# Patient Record
Sex: FEMALE | Race: WHITE | NOT HISPANIC OR LATINO | URBAN - METROPOLITAN AREA
[De-identification: names, ages, dates, MRNs, and addresses within clinical notes are randomized per-mention and may not be internally consistent; named-entity substitution may affect disease eponyms.]

---

## 2017-12-09 ENCOUNTER — EMERGENCY (EMERGENCY)
Facility: HOSPITAL | Age: 47
LOS: 1 days | Discharge: HOME | End: 2017-12-09
Attending: EMERGENCY MEDICINE | Admitting: EMERGENCY MEDICINE
Payer: COMMERCIAL

## 2017-12-09 VITALS
RESPIRATION RATE: 18 BRPM | HEART RATE: 105 BPM | OXYGEN SATURATION: 97 % | SYSTOLIC BLOOD PRESSURE: 107 MMHG | TEMPERATURE: 98 F | DIASTOLIC BLOOD PRESSURE: 82 MMHG

## 2017-12-09 VITALS — HEIGHT: 66 IN | WEIGHT: 190.04 LBS

## 2017-12-09 DIAGNOSIS — K52.9 NONINFECTIVE GASTROENTERITIS AND COLITIS, UNSPECIFIED: ICD-10-CM

## 2017-12-09 DIAGNOSIS — Z88.8 ALLERGY STATUS TO OTHER DRUGS, MEDICAMENTS AND BIOLOGICAL SUBSTANCES STATUS: ICD-10-CM

## 2017-12-09 DIAGNOSIS — R11.2 NAUSEA WITH VOMITING, UNSPECIFIED: ICD-10-CM

## 2017-12-09 PROCEDURE — 99283 EMERGENCY DEPT VISIT LOW MDM: CPT

## 2017-12-09 PROCEDURE — 99284 EMERGENCY DEPT VISIT MOD MDM: CPT

## 2017-12-09 RX ORDER — ONDANSETRON 8 MG/1
4 TABLET, FILM COATED ORAL ONCE
Qty: 0 | Refills: 0 | Status: COMPLETED | OUTPATIENT
Start: 2017-12-09 | End: 2017-12-09

## 2017-12-09 RX ORDER — FAMOTIDINE 10 MG/ML
20 INJECTION INTRAVENOUS ONCE
Qty: 0 | Refills: 0 | Status: DISCONTINUED | OUTPATIENT
Start: 2017-12-09 | End: 2017-12-09

## 2017-12-09 RX ORDER — SODIUM CHLORIDE 9 MG/ML
1000 INJECTION INTRAMUSCULAR; INTRAVENOUS; SUBCUTANEOUS ONCE
Qty: 0 | Refills: 0 | Status: COMPLETED | OUTPATIENT
Start: 2017-12-09 | End: 2017-12-09

## 2017-12-09 RX ADMIN — ONDANSETRON 4 MILLIGRAM(S): 8 TABLET, FILM COATED ORAL at 21:22

## 2017-12-09 NOTE — ED ADULT NURSE NOTE - OBJECTIVE STATEMENT
Pt BIBA from [facility], where she c/o ABD PN, NVD; reportedly last meal ~ noon, when she ate pizza. 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 When approached for IV and blood draw, pt actively vomiting, though emetic event appeared forced. Pt given one dose po ZOFRAN ODT to facilitate continued bedside care. Thereafter same became agitated, stated that she can only have blood drawn from her left ACV; upon application of tourniquet, pt immediately shouted that same was too tight and that it needed to be removed. After removal of tourniquet, pt stated that she no longer wanted to stay in ED and demanded that ED staff 'call me a taxi' so that she may return to \[facility]\"". Pt's wishes discussed with KAYLEE CONNOLLY, thereafter spoken to by MD NAPOLES and pt departed. Pt BIBA from [facility], where she c/o ABD PN, NVD; reportedly last meal ~ noon, when she ate pizza. 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 When approached for IV and blood draw, pt actively vomiting, though emetic event appeared forced. Pt given one dose po ZOFRAN ODT to facilitate continued bedside care. Thereafter same became agitated, stated that she can only have blood drawn from her left ACV; upon application of tourniquet, pt immediately shouted that same was too tight and that it needed to be removed. After removal of tourniquet, pt stated that she no longer wanted to stay in ED and demanded that ED staff 'call me a taxi' so that she may return to \[facility]\"". Pt's wishes discussed with KAYLEE CONNOLLY, thereafter spoken to by MD NAPOLES, who advised that exam was not complete at this time; pt stated that she understood and agreed with MD NAPOLES to seek medical tx should her sx remain unresolved or worsen; pt then departed ED.

## 2017-12-09 NOTE — ED PROVIDER NOTE - MEDICAL DECISION MAKING DETAILS
48 yo F with pmh of depression c/o n/v/d for the past 3 hours. No abd pain. Afebrile. Abd soft, nt, nd. Likely gastroenteritis. Labs, fluids, zofran.

## 2017-12-09 NOTE — ED PROVIDER NOTE - OBJECTIVE STATEMENT
48 yo F with pmh of depression c/o n/v/d for the past 3 hours. Admits to non-bloody, non-bilious vomiting, and non-bloody diarrhea. Denies abd pain. Denies fever, chills, sick contacts, abx use. pt last ate pizza around 12pm. Pt is visiting from [place].

## 2019-08-18 ENCOUNTER — HOSPITAL ENCOUNTER (EMERGENCY)
Facility: HOSPITAL | Age: 49
Discharge: HOME | End: 2019-08-18
Attending: EMERGENCY MEDICINE | Admitting: EMERGENCY MEDICINE

## 2019-08-18 VITALS
HEART RATE: 72 BPM | BODY MASS INDEX: 28.34 KG/M2 | WEIGHT: 176.37 LBS | TEMPERATURE: 98.8 F | RESPIRATION RATE: 16 BRPM | OXYGEN SATURATION: 97 % | SYSTOLIC BLOOD PRESSURE: 133 MMHG | DIASTOLIC BLOOD PRESSURE: 80 MMHG | HEIGHT: 66 IN

## 2019-08-18 DIAGNOSIS — R11.11 VOMITING WITHOUT NAUSEA, INTRACTABILITY OF VOMITING NOT SPECIFIED, UNSPECIFIED VOMITING TYPE: Primary | ICD-10-CM

## 2019-08-18 DIAGNOSIS — E87.6 HYPOKALEMIA: ICD-10-CM

## 2019-08-18 LAB
ANION GAP SERPL CALCULATED.3IONS-SCNC: 15.8 MMOL/L (ref 5–15)
BASOPHILS # BLD AUTO: 0.1 10*3/MM3 (ref 0–0.2)
BASOPHILS NFR BLD AUTO: 1 % (ref 0–1.5)
BUN BLD-MCNC: 9 MG/DL (ref 8–20)
BUN/CREAT SERPL: 10 (ref 5.4–26.2)
CALCIUM SPEC-SCNC: 8.7 MG/DL (ref 8.9–10.3)
CHLORIDE SERPL-SCNC: 109 MMOL/L (ref 101–111)
CO2 SERPL-SCNC: 19 MMOL/L (ref 22–32)
CREAT BLD-MCNC: 0.9 MG/DL (ref 0.4–1)
DEPRECATED RDW RBC AUTO: 46.4 FL (ref 37–54)
EOSINOPHIL # BLD AUTO: 0.1 10*3/MM3 (ref 0–0.4)
EOSINOPHIL NFR BLD AUTO: 1 % (ref 0.3–6.2)
ERYTHROCYTE [DISTWIDTH] IN BLOOD BY AUTOMATED COUNT: 14.2 % (ref 12.3–15.4)
GFR SERPL CREATININE-BSD FRML MDRD: 66 ML/MIN/1.73
GLUCOSE BLD-MCNC: 103 MG/DL (ref 65–99)
HCT VFR BLD AUTO: 40.2 % (ref 34–46.6)
HGB BLD-MCNC: 13.6 G/DL (ref 12–15.9)
LYMPHOCYTES # BLD AUTO: 3.6 10*3/MM3 (ref 0.7–3.1)
LYMPHOCYTES NFR BLD AUTO: 24.2 % (ref 19.6–45.3)
MCH RBC QN AUTO: 31.6 PG (ref 26.6–33)
MCHC RBC AUTO-ENTMCNC: 33.9 G/DL (ref 31.5–35.7)
MCV RBC AUTO: 93.2 FL (ref 79–97)
MONOCYTES # BLD AUTO: 0.9 10*3/MM3 (ref 0.1–0.9)
MONOCYTES NFR BLD AUTO: 6.3 % (ref 5–12)
NEUTROPHILS # BLD AUTO: 10.1 10*3/MM3 (ref 1.7–7)
NEUTROPHILS NFR BLD AUTO: 67.5 % (ref 42.7–76)
NRBC BLD AUTO-RTO: 0 /100 WBC (ref 0–0.2)
PLATELET # BLD AUTO: 478 10*3/MM3 (ref 140–450)
PMV BLD AUTO: 7 FL (ref 6–12)
POTASSIUM BLD-SCNC: 2.8 MMOL/L (ref 3.6–5.1)
RBC # BLD AUTO: 4.31 10*6/MM3 (ref 3.77–5.28)
SODIUM BLD-SCNC: 141 MMOL/L (ref 136–144)
WBC NRBC COR # BLD: 15 10*3/MM3 (ref 3.4–10.8)

## 2019-08-18 PROCEDURE — 99283 EMERGENCY DEPT VISIT LOW MDM: CPT

## 2019-08-18 PROCEDURE — 25010000002 ONDANSETRON PER 1 MG: Performed by: EMERGENCY MEDICINE

## 2019-08-18 PROCEDURE — 85025 COMPLETE CBC W/AUTO DIFF WBC: CPT | Performed by: EMERGENCY MEDICINE

## 2019-08-18 PROCEDURE — 80048 BASIC METABOLIC PNL TOTAL CA: CPT | Performed by: EMERGENCY MEDICINE

## 2019-08-18 RX ORDER — PANTOPRAZOLE SODIUM 20 MG/1
20 TABLET, DELAYED RELEASE ORAL DAILY
Qty: 14 TABLET | Refills: 0 | Status: SHIPPED | OUTPATIENT
Start: 2019-08-18

## 2019-08-18 RX ORDER — ONDANSETRON 4 MG/1
4 TABLET, ORALLY DISINTEGRATING ORAL EVERY 8 HOURS PRN
Qty: 8 TABLET | Refills: 0 | Status: SHIPPED | OUTPATIENT
Start: 2019-08-18

## 2019-08-18 RX ORDER — SODIUM CHLORIDE 0.9 % (FLUSH) 0.9 %
10 SYRINGE (ML) INJECTION AS NEEDED
Status: DISCONTINUED | OUTPATIENT
Start: 2019-08-18 | End: 2019-08-18 | Stop reason: HOSPADM

## 2019-08-18 RX ORDER — ONDANSETRON 2 MG/ML
4 INJECTION INTRAMUSCULAR; INTRAVENOUS ONCE
Status: COMPLETED | OUTPATIENT
Start: 2019-08-18 | End: 2019-08-18

## 2019-08-18 RX ORDER — POTASSIUM CHLORIDE 750 MG/1
10 TABLET, FILM COATED, EXTENDED RELEASE ORAL DAILY
Qty: 5 TABLET | Refills: 0 | Status: SHIPPED | OUTPATIENT
Start: 2019-08-18

## 2019-08-18 RX ADMIN — ONDANSETRON 4 MG: 2 INJECTION INTRAMUSCULAR; INTRAVENOUS at 19:13

## 2019-08-18 RX ADMIN — SODIUM CHLORIDE 1000 ML: 0.9 INJECTION, SOLUTION INTRAVENOUS at 19:14

## 2019-08-18 NOTE — ED NOTES
"Arrives via EMS, reports being sick since Friday with N/V, dizziness, reports getting to work and feeling faint, EMS was called due to pt \" almost passing out\"     Edith Rodarte RN  08/18/19 3505    "

## 2019-08-19 NOTE — ED PROVIDER NOTES
Subjective   Patient is a 49-year-old female complaining of persistent vomiting for the past 48 hours.  Denies cough fever diarrhea dysuria his weight loss or other associated complaints.            Review of Systems  For headache ears no cough fever chest pain shortness of breath diarrhea dysuria weakness weight loss or other complaint  No past medical history on file.    Allergies   Allergen Reactions   • Zolpidem Hives       No past surgical history on file.    No family history on file.    Social History     Socioeconomic History   • Marital status: Single     Spouse name: Not on file   • Number of children: Not on file   • Years of education: Not on file   • Highest education level: Not on file           Objective   Physical Exam  HEENT exam shows TMs to be clear.  Oropharynx, spit sclerae nonicteric.  Neck has no adenopathy JVD or bruits.  Lungs are clear.  Heart has regular rhythm without murmur gallop her chest is nontender.  Abdomen soft nontender.  Patient has normal bowel sounds without renogram.  Back has no CVA tenderness.  Procedures           ED Course      Results for orders placed or performed during the hospital encounter of 08/18/19   Basic Metabolic Panel   Result Value Ref Range    Glucose 103 (H) 65 - 99 mg/dL    BUN 9 8 - 20 mg/dL    Creatinine 0.90 0.40 - 1.00 mg/dL    Sodium 141 136 - 144 mmol/L    Potassium 2.8 (L) 3.6 - 5.1 mmol/L    Chloride 109 101 - 111 mmol/L    CO2 19.0 (L) 22.0 - 32.0 mmol/L    Calcium 8.7 (L) 8.9 - 10.3 mg/dL    eGFR Non African Amer 66 >60 mL/min/1.73    BUN/Creatinine Ratio 10.0 5.4 - 26.2    Anion Gap 15.8 (H) 5.0 - 15.0 mmol/L   CBC Auto Differential   Result Value Ref Range    WBC 15.00 (H) 3.40 - 10.80 10*3/mm3    RBC 4.31 3.77 - 5.28 10*6/mm3    Hemoglobin 13.6 12.0 - 15.9 g/dL    Hematocrit 40.2 34.0 - 46.6 %    MCV 93.2 79.0 - 97.0 fL    MCH 31.6 26.6 - 33.0 pg    MCHC 33.9 31.5 - 35.7 g/dL    RDW 14.2 12.3 - 15.4 %    RDW-SD 46.4 37.0 - 54.0 fl    MPV 7.0  6.0 - 12.0 fL    Platelets 478 (H) 140 - 450 10*3/mm3    Neutrophil % 67.5 42.7 - 76.0 %    Lymphocyte % 24.2 19.6 - 45.3 %    Monocyte % 6.3 5.0 - 12.0 %    Eosinophil % 1.0 0.3 - 6.2 %    Basophil % 1.0 0.0 - 1.5 %    Neutrophils, Absolute 10.10 (H) 1.70 - 7.00 10*3/mm3    Lymphocytes, Absolute 3.60 (H) 0.70 - 3.10 10*3/mm3    Monocytes, Absolute 0.90 0.10 - 0.90 10*3/mm3    Eosinophils, Absolute 0.10 0.00 - 0.40 10*3/mm3    Basophils, Absolute 0.10 0.00 - 0.20 10*3/mm3    nRBC 0.0 0.0 - 0.2 /100 WBC                 MDM  Number of Diagnoses or Management Options  Diagnosis management comments: Patient is benign physical exam.  She has no evidence of severe intra-abdominal pathology.  Metabolic panel is normal.  There is no evidence of other infectious process though she most likely has gastroenteritis.  Patient will be discharged with a prescription for Protonix Zofran and KCl for hypokalemia.  She will see MD for recheck.    Risk of Complications, Morbidity, and/or Mortality  Presenting problems: high  Diagnostic procedures: high  Management options: high    Patient Progress  Patient progress: stable        Final diagnoses:   Vomiting without nausea, intractability of vomiting not specified, unspecified vomiting type   Hypokalemia            Abdon Garcia MD  08/18/19 2017

## 2019-11-29 NOTE — ED ADULT NURSE NOTE - ISOLATION TYPE:
The Service to PODIATRY order in workqueue [id number] requested on 5/9/2019 has been removed as, unable to contact. Ordering provider has been notified.    Please contact patient, if further communication is needed.  
None

## 2020-04-07 ENCOUNTER — LAB REQUISITION (OUTPATIENT)
Dept: LAB | Facility: HOSPITAL | Age: 50
End: 2020-04-07

## 2020-04-07 DIAGNOSIS — Z00.00 ENCOUNTER FOR GENERAL ADULT MEDICAL EXAMINATION WITHOUT ABNORMAL FINDINGS: ICD-10-CM

## 2020-04-07 PROCEDURE — U0001 2019-NCOV DIAGNOSTIC P: HCPCS | Performed by: EMERGENCY MEDICINE

## 2020-04-09 LAB
REF LAB TEST METHOD: NORMAL
SARS-COV-2 RNA RESP QL NAA+PROBE: NOT DETECTED

## 2022-04-21 ENCOUNTER — ON CAMPUS - OUTPATIENT (OUTPATIENT)
Dept: URBAN - METROPOLITAN AREA HOSPITAL 77 | Facility: HOSPITAL | Age: 52
End: 2022-04-21
Payer: COMMERCIAL

## 2022-04-21 DIAGNOSIS — R11.2 NAUSEA WITH VOMITING, UNSPECIFIED: ICD-10-CM

## 2022-04-21 DIAGNOSIS — R19.7 DIARRHEA, UNSPECIFIED: ICD-10-CM

## 2022-04-21 DIAGNOSIS — R07.89 OTHER CHEST PAIN: ICD-10-CM

## 2022-04-21 DIAGNOSIS — D72.829 ELEVATED WHITE BLOOD CELL COUNT, UNSPECIFIED: ICD-10-CM

## 2022-04-21 DIAGNOSIS — F12.90 CANNABIS USE, UNSPECIFIED, UNCOMPLICATED: ICD-10-CM

## 2022-04-21 PROCEDURE — 99203 OFFICE O/P NEW LOW 30 MIN: CPT | Performed by: INTERNAL MEDICINE

## 2022-04-22 ENCOUNTER — ON CAMPUS - OUTPATIENT (OUTPATIENT)
Dept: URBAN - METROPOLITAN AREA HOSPITAL 77 | Facility: HOSPITAL | Age: 52
End: 2022-04-22
Payer: COMMERCIAL

## 2022-04-22 DIAGNOSIS — R19.7 DIARRHEA, UNSPECIFIED: ICD-10-CM

## 2022-04-22 DIAGNOSIS — R11.0 NAUSEA: ICD-10-CM

## 2022-04-22 PROCEDURE — 99212 OFFICE O/P EST SF 10 MIN: CPT | Performed by: NURSE PRACTITIONER

## 2023-09-10 NOTE — ED PROVIDER NOTE - ATTENDING CONTRIBUTION TO CARE
- - - pt with symptoms classic for gastroenteritis, Patient dramatically proclaimed that the nurse was attempting to hurt her when he tried to put an IV in her, patient allowed me to examine her abd, diffusely tender however dramatic response prior to hand reaching her abdomen, I suspect likely only gastroenteritis and will resolve w time and supportive care however due to reported tenderness on her exam I advised pt to stay for further workup which she refused, as she needed to make a train and she was mad at our nurse.  I discussed risks and encouraged her to go to an ER when she arrived home if symptoms have not improved.

## 2023-12-24 ENCOUNTER — HOSPITAL ENCOUNTER (OUTPATIENT)
Facility: HOSPITAL | Age: 53
Discharge: HOME | End: 2023-12-24
Attending: EMERGENCY MEDICINE
Payer: MEDICAID

## 2023-12-24 VITALS
DIASTOLIC BLOOD PRESSURE: 107 MMHG | HEIGHT: 62 IN | OXYGEN SATURATION: 97 % | TEMPERATURE: 97.9 F | WEIGHT: 140 LBS | BODY MASS INDEX: 25.76 KG/M2 | RESPIRATION RATE: 20 BRPM | HEART RATE: 118 BPM | SYSTOLIC BLOOD PRESSURE: 163 MMHG

## 2023-12-24 DIAGNOSIS — L29.9 ITCHING WITH IRRITATION: Primary | ICD-10-CM

## 2023-12-24 PROCEDURE — 96372 THER/PROPH/DIAG INJ SC/IM: CPT

## 2023-12-24 PROCEDURE — 25010000002 DIPHENHYDRAMINE PER 50 MG: Performed by: PHYSICIAN ASSISTANT

## 2023-12-24 PROCEDURE — G0463 HOSPITAL OUTPT CLINIC VISIT: HCPCS | Performed by: PHYSICIAN ASSISTANT

## 2023-12-24 RX ORDER — DIPHENHYDRAMINE HYDROCHLORIDE 50 MG/ML
50 INJECTION INTRAMUSCULAR; INTRAVENOUS ONCE
Status: COMPLETED | OUTPATIENT
Start: 2023-12-24 | End: 2023-12-24

## 2023-12-24 RX ORDER — HYDROXYZINE HYDROCHLORIDE 25 MG/1
25 TABLET, FILM COATED ORAL EVERY 6 HOURS PRN
Qty: 40 TABLET | Refills: 0 | Status: SHIPPED | OUTPATIENT
Start: 2023-12-24 | End: 2024-01-03

## 2023-12-24 RX ADMIN — DIPHENHYDRAMINE HYDROCHLORIDE 50 MG: 50 INJECTION, SOLUTION INTRAMUSCULAR; INTRAVENOUS at 18:38

## 2023-12-25 NOTE — FSED PROVIDER NOTE
Subjective   History of Present Illness  Patient presents to the clinic today complaining of itching all over.  Patient symptoms started yesterday.  Patient denies coming into contact with anything or have any changes in soaps or detergents.  Patient denies any chest pain, shortness of breath, abdominal pain, nausea, vomiting or fever.      Review of Systems    No past medical history on file.    Allergies   Allergen Reactions    Zolpidem Hives       No past surgical history on file.    No family history on file.    Social History     Socioeconomic History    Marital status: Single           Objective   Physical Exam  Constitutional:       General: She is not in acute distress.     Appearance: Normal appearance. She is normal weight. She is not ill-appearing or toxic-appearing.   HENT:      Head: Normocephalic and atraumatic.      Nose: Nose normal. No congestion or rhinorrhea.   Eyes:      General: No scleral icterus.        Right eye: No discharge.         Left eye: No discharge.      Extraocular Movements: Extraocular movements intact.      Conjunctiva/sclera: Conjunctivae normal.      Pupils: Pupils are equal, round, and reactive to light.   Cardiovascular:      Rate and Rhythm: Regular rhythm. Tachycardia present.      Pulses: Normal pulses.      Heart sounds: Normal heart sounds. No murmur heard.     No friction rub. No gallop.   Pulmonary:      Effort: Pulmonary effort is normal. No respiratory distress.      Breath sounds: Normal breath sounds. No wheezing.   Musculoskeletal:         General: No swelling, tenderness or signs of injury. Normal range of motion.      Cervical back: Normal range of motion. No tenderness.   Skin:     General: Skin is warm and dry.      Coloration: Skin is not pale.      Findings: No erythema or rash.   Neurological:      General: No focal deficit present.      Mental Status: She is alert and oriented to person, place, and time.      Cranial Nerves: No cranial nerve deficit.    Psychiatric:         Attention and Perception: Attention normal.         Mood and Affect: Mood is anxious.         Speech: Speech normal.         Behavior: Behavior normal. Behavior is cooperative.         Thought Content: Thought content normal. Thought content does not include homicidal or suicidal ideation.         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  Patient was given Benadryl IM in the clinic which did relieve her symptoms.  Patient will be discharged home with a prescription for hydroxyzine.  Patient can follow-up with her PCP.  Patient return to clinic if symptoms persist or worsen.    Problems Addressed:  Itching with irritation: complicated acute illness or injury    Risk  Prescription drug management.        Final diagnoses:   Itching with irritation       ED Disposition  ED Disposition       ED Disposition   Discharge    Condition   Stable    Comment   --               Head, John Paul Gabriel MD  [address] [place] [address] [place] [facility]  [phone]    Go to   As needed         Medication List        New Prescriptions      hydrOXYzine 25 MG tablet  Commonly known as: ATARAX  Take 1 tablet by mouth Every 6 (Six) Hours As Needed for Itching for up to 10 days.               Where to Get Your Medications        You can get these medications from any pharmacy    Bring a paper prescription for each of these medications  hydrOXYzine 25 MG tablet